# Patient Record
Sex: FEMALE | Race: BLACK OR AFRICAN AMERICAN
[De-identification: names, ages, dates, MRNs, and addresses within clinical notes are randomized per-mention and may not be internally consistent; named-entity substitution may affect disease eponyms.]

---

## 2018-05-09 ENCOUNTER — HOSPITAL ENCOUNTER (EMERGENCY)
Dept: HOSPITAL 44 - ED | Age: 58
LOS: 1 days | Discharge: HOME | End: 2018-05-10
Payer: SELF-PAY

## 2018-05-09 DIAGNOSIS — I10: Primary | ICD-10-CM

## 2018-05-09 LAB
BASOPHILS NFR BLD: 0.2 % (ref 0–1.5)
EOSINOPHIL NFR BLD: 0.7 % (ref 0–6.8)
MCH RBC QN AUTO: 28.9 PG (ref 28–34)
MCV RBC AUTO: 93 FL (ref 80–100)
MONOCYTES %: 4.3 % (ref 0–11)
NEUTROPHILS #: 4.1 # K/UL (ref 1.4–7.7)

## 2018-05-09 PROCEDURE — 99283 EMERGENCY DEPT VISIT LOW MDM: CPT

## 2018-05-09 PROCEDURE — 85025 COMPLETE CBC W/AUTO DIFF WBC: CPT

## 2018-05-09 PROCEDURE — 80053 COMPREHEN METABOLIC PANEL: CPT

## 2018-05-10 VITALS — SYSTOLIC BLOOD PRESSURE: 190 MMHG | DIASTOLIC BLOOD PRESSURE: 99 MMHG

## 2018-05-10 LAB
EGFR (AFRICAN): > 60
EGFR (NON-AFRICAN): > 60

## 2018-05-10 NOTE — ED PHYSICIAN DOCUMENTATION
General Adult





- HISTORIAN


Historian: patient





- HPI


Stated Complaint: HTN


Chief Complaint: General Adult


Onset: hours


Timing: worse


Further Comments: yes (58 year old female patient presents with complaint of 

hypertension.  States her BP was 200s/100s at home.  States she does not take 

daily BP medications, has not seen PCP since .  Denies SOB or CP.  C/O mild 

dizziness and mild headache.)





- ROS


CONST: no problems


EYES/ENT: none


CVS/RESP: none


GI/: none


MS/SKIN/LYMPH: none


NEURO/PSYCH: headache, dizziness.  denies: fainting, tingling, numbness, 

difficulty walking, difficulty with speech, anxiety, depression





- PAST HX


Past History: none


Other History: none


Surgeries/Procedures: 


Allergies/Adverse Reactions: 


 Allergies











Allergy/AdvReac Type Severity Reaction Status Date / Time


 


No Known Allergies Allergy   Unverified 18 23:50














Home Medications: 


 Ambulatory Orders











 Medication  Instructions  Recorded


 


Lisinopril [Zestril] 10 mg PO DAILY #30 tablet 05/10/18














- SOCIAL HX


Smoking History: cigarettes





- FAMILY HX


Family History: No





- VITAL SIGNS


Vital Signs: 





 Vital Signs











Temp Pulse Resp BP Pulse Ox


 


    67   16   190/99   99 


 


    05/10/18 00:15  05/10/18 00:15  05/10/18 00:15  05/10/18 00:15














- REVIEWED ASSESSMENTS


Nursing Assessment  Reviewed: Yes


Vitals Reviewed: Yes





Progress





- Progress


Progress: 





Medicated with clonidine .1 mg PO in the Er. 





ED Results Lab/Radiology





- Lab Results


Lab Results: 





 Lab Results











  18





  23:35 23:35


 


WBC    5.90 K/ul K/ul





    (4.00-12.00) 


 


RBC    4.20 M/ul M/ul





    (3.90-5.20) 


 


Hgb    12.1 g/dL g/dL





    (12.0-16.0) 


 


Hct    39.1 % %





    (34.5-46.5) 


 


MCV    93.0 fl fl





    (80.0-100.0) 


 


MCH    28.9 pg pg





    (28.0-34.0) 


 


MCHC    31.1 g/dL g/dL





    (30.0-36.0) 


 


RDW    13.2 % %





    (11.3-14.3) 


 


Plt Count    227 K/mm3 K/mm3





    (130-400) 


 


Neut % (Auto)    69.7 % %





    (39.0-79.0) 


 


Lymph % (Auto)    23.9 % %





    (16.0-50.0) 


 


Mono % (Auto)    4.3 % %





    (0.0-11.0) 


 


Eos % (Auto)    0.7 % %





    (0.0-6.8) 


 


Baso % (Auto)    0.2 





    (0.0-1.5) 


 


Neut # (Auto)    4.1 # k/uL # k/uL





    (1.4-7.7) 


 


Lymph # (Auto)    1.4 # k/uL # k/uL





    (0.6-4.0) 


 


Mono # (Auto)    0.3 # k/uL # k/uL





    (0.0-0.9) 


 


Eos # (Auto)    0.0 # k/uL # k/uL





    (0.0-0.6) 


 


Baso # (Auto)    0.0 # k/uL # k/uL





    (0.0-0.5) 


 


Reactive Lymphs %    1.2 % %





    (0.0-5.0) 


 


Reactive Lymphs #    0.1 # k/uL # k/uL





    (0.0-0.8) 


 


Sodium  139 mmol/L mmol/L  





   (136-145)  


 


Potassium  4.0 mmol/L mmol/L  





   (3.5-5.1)  


 


Chloride  105 mmol/L mmol/L  





   ()  


 


Carbon Dioxide  27 mmol/L mmol/L  





   (22-30)  


 


BUN  14 mg/dL mg/dL  





   (7-17)  


 


Creatinine  0.90 mg/dL mg/dL  





   (0.52-1.04)  


 


Estimated Creat Clear  67   





   


 


Est GFR ( Amer)  > 60   





  (60 - ) 


 


Est GFR (Non-Af Amer)  > 60   





  (60 - ) 


 


Glucose  243 mg/dL H mg/dL  





   ()  


 


Calcium  9.5 mg/dL mg/dL  





   (8.4-10.2)  


 


Total Bilirubin  0.6 mg/dL mg/dL  





   (0.2-1.3)  


 


AST  16 U/L U/L  





   (15-46)  


 


ALT  17 U/L U/L  





   (13-69)  


 


Alkaline Phosphatase  94 U/L U/L  





   ()  


 


Total Protein  6.7 g/dL g/dL  





   (6.3-8.2)  


 


Albumin  3.4 g/dL L g/dL  





   (3.5-5.0)  














- Orders


Orders: 





 ED Orders











 Category Date Time Status


 


 CBC/PLATELET/DIFF Stat Lab  18 23:35 Completed


 


 CMP Stat Lab  18 23:35 Completed


 


 CloNIDine HCL [Catapress] Med  18 23:02 Discontinued





 0.1 mg PO NOW ONE   














General Adult Physical Exam





- PHYSICAL EXAM


GENERAL APPEARANCE: [id number] N


EENT: eye inspection normal, HAILY


RESPIRATORY: no resp distress, chest non-tender, breath sounds normal


CVS: reg rate & rhythm, heart sounds normal, equal pulses, no murmur, no gallop

, PMI nml, no JVD, no friction rub, 24


ABDOMEN: soft, no organomegaly, normal bowel sounds, no abdominal bruit, no 

distension


SKIN: normal color, warm/dry, NR, INT, PAL, DR


EXTREMITIES: non-tender, normal range of motion, no evidence of injury, no edema

, J, NP


NEURO: oriented X3, CN's nml as tested, motor nml, sensation nml, mood/affect 

nml





Discharge


Clincal Impression: 


Hypertension


Qualifiers:


 Hypertension type: essential hypertension Qualified Code(s): I10 - Essential (

primary) hypertension





Prescriptions: 


Lisinopril [Zestril] 10 mg PO DAILY #30 tablet


Referrals: 


Primary Doctor,No [Primary Care Provider] - 2 Days


Condition: Stable


Disposition: 01 HOME, SELF-CARE


Decision to Admit: NO


Decision Time: 00:05

## 2018-08-15 ENCOUNTER — HOSPITAL ENCOUNTER (EMERGENCY)
Dept: HOSPITAL 44 - ED | Age: 58
Discharge: HOME | End: 2018-08-15
Payer: SELF-PAY

## 2018-08-15 VITALS — DIASTOLIC BLOOD PRESSURE: 108 MMHG | SYSTOLIC BLOOD PRESSURE: 166 MMHG
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
## 2018-08-15 DIAGNOSIS — I10: ICD-10-CM
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
## 2018-08-15 DIAGNOSIS — S40.861A: Primary | ICD-10-CM

## 2018-08-15 DIAGNOSIS — T63.481A: ICD-10-CM

## 2018-08-15 LAB
BASOPHILS NFR BLD: 0.4 % (ref 0–1.5)
EGFR (AFRICAN): > 60
EGFR (NON-AFRICAN): > 60
EOSINOPHIL NFR BLD: 0.7 % (ref 0–6.8)
MCH RBC QN AUTO: 29.6 PG (ref 28–34)
MCV RBC AUTO: 93.4 FL (ref 80–100)
MONOCYTES %: 4 % (ref 0–11)
NEUTROPHILS #: 3.2 # K/UL (ref 1.4–7.7)

## 2018-08-15 PROCEDURE — 80053 COMPREHEN METABOLIC PANEL: CPT

## 2018-08-15 PROCEDURE — 99283 EMERGENCY DEPT VISIT LOW MDM: CPT

## 2018-08-15 PROCEDURE — 84484 ASSAY OF TROPONIN QUANT: CPT

## 2018-08-15 PROCEDURE — 85025 COMPLETE CBC W/AUTO DIFF WBC: CPT

## 2018-08-15 NOTE — ED PHYSICIAN DOCUMENTATION
General Adult





- HISTORIAN


Historian: patient





- HPI


Stated Complaint: wasp sting to right upper arm


Chief Complaint: Insect Bite


Onset: hours (2)


Timing: still present


Severity: mild


Further Comments: yes (She states around 1 pm she had a wasp sting her (she did 

see the wasp) she then took mud from the yard where the wasp was and applyed it 

to her arm with a bandage. She has hypertension. She has not been taking her 

medication. She has no chest pain. No other complaints. She did not take any OTC

meds for her sting>)


Last known Well Code/Unknown Code: Unknown





- ROS


CONST: no problems


EYES/ENT: none


CVS/RESP: none


GI/: none


MS/SKIN/LYMPH: none


NEURO/PSYCH: denies: headache, fainting, dizziness





- PAST HX


Past History: hypertension


Immunizations: UTD


Allergies/Adverse Reactions: 


                                    Allergies











Allergy/AdvReac Type Severity Reaction Status Date / Time


 


No Known Allergies Allergy   Verified 08/15/18 15:29














Home Medications: 


                                Ambulatory Orders











 Medication  Instructions  Recorded


 


Lisinopril [Zestril] 10 mg PO DAILY #30 tablet 05/10/18














- SOCIAL HX


Smoking History: cigarettes


Alcohol Use: none


Drug Use: none





- FAMILY HX


Family History: No





- VITAL SIGNS


Vital Signs: 





                                   Vital Signs











Temp Pulse Resp BP Pulse Ox


 


          190/99    


 


          05/10/18 00:15   














- REVIEWED ASSESSMENTS


Nursing Assessment  Reviewed: Yes


Vitals Reviewed: Yes





Progress





- Progress


Progress: 





1635: continue to monitor b/p. No pain complaints DG 





General Adult Physical Exam





- PHYSICAL EXAM


GENERAL APPEARANCE: no distress


EENT: eye inspection normal, ENT inspection normal


NECK: normal inspection


RESPIRATORY: no resp distress, chest non-tender, breath sounds normal


CVS: reg rate & rhythm, heart sounds normal, equal pulses, no murmur


ABDOMEN: soft, normal bowel sounds


BACK: normal inspection


SKIN: warm/dry, normal color


EXTREMITIES: non-tender, normal range of motion, no evidence of injury, no edema


NEURO: oriented X3





Discharge


Clincal Impression: 


Hypertension


Qualifiers:


 Hypertension type: unspecified Qualified Code(s): I10 - Essential (primary) 

hypertension





Insect sting


Qualifiers:


 Encounter type: initial encounter Injury intent: accidental or unintentional 

Qualified Code(s): T63.481A - Toxic effect of venom of other arthropod, accident

al (unintentional), initial encounter





Referrals: 


Primary Doctor,No [Primary Care Provider] - 2 Days


Comments: 





1. Keep area clean and dry


2. Benadryl as needed for any skin or allergy concerns per directions on box


3. TAKE YOUR B/P MEDS


4. See PCP in 2-4 days


5. Return to ER for any concerns 


Condition: Stable


Disposition: 01 HOME, SELF-CARE


Decision to Admit: NO


Date of Decison to Admit: 08/15/18


Decision Time: 16:55